# Patient Record
Sex: MALE | Race: BLACK OR AFRICAN AMERICAN | Employment: FULL TIME | URBAN - METROPOLITAN AREA
[De-identification: names, ages, dates, MRNs, and addresses within clinical notes are randomized per-mention and may not be internally consistent; named-entity substitution may affect disease eponyms.]

---

## 2018-01-16 ENCOUNTER — ED HISTORICAL/CONVERTED ENCOUNTER (OUTPATIENT)
Dept: OTHER | Age: 33
End: 2018-01-16

## 2018-04-18 ENCOUNTER — OP HISTORICAL/CONVERTED ENCOUNTER (OUTPATIENT)
Dept: OTHER | Age: 33
End: 2018-04-18

## 2018-05-03 ENCOUNTER — OP HISTORICAL/CONVERTED ENCOUNTER (OUTPATIENT)
Dept: OTHER | Age: 33
End: 2018-05-03

## 2018-06-01 ENCOUNTER — OP HISTORICAL/CONVERTED ENCOUNTER (OUTPATIENT)
Dept: OTHER | Age: 33
End: 2018-06-01

## 2019-08-03 ENCOUNTER — ED HISTORICAL/CONVERTED ENCOUNTER (OUTPATIENT)
Dept: OTHER | Age: 34
End: 2019-08-03

## 2020-04-01 ENCOUNTER — ED HISTORICAL/CONVERTED ENCOUNTER (OUTPATIENT)
Dept: OTHER | Age: 35
End: 2020-04-01

## 2020-09-08 ENCOUNTER — OP HISTORICAL/CONVERTED ENCOUNTER (OUTPATIENT)
Dept: OTHER | Age: 35
End: 2020-09-08

## 2020-09-28 ENCOUNTER — APPOINTMENT (OUTPATIENT)
Dept: PHYSICAL THERAPY | Age: 35
End: 2020-09-28

## 2020-10-02 ENCOUNTER — TRANSCRIBE ORDER (OUTPATIENT)
Dept: SCHEDULING | Age: 35
End: 2020-10-02

## 2020-10-02 ENCOUNTER — HOSPITAL ENCOUNTER (OUTPATIENT)
Dept: PHYSICAL THERAPY | Age: 35
Discharge: HOME | End: 2020-10-02
Payer: COMMERCIAL

## 2020-10-02 DIAGNOSIS — G89.29 CHRONIC BACK PAIN: Primary | ICD-10-CM

## 2020-10-02 DIAGNOSIS — M54.9 CHRONIC BACK PAIN: Primary | ICD-10-CM

## 2020-10-02 PROCEDURE — 97161 PT EVAL LOW COMPLEX 20 MIN: CPT
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
## 2020-10-02 NOTE — PROGRESS NOTES
[address] [address] [facility] [address] [facility], [address] [place] [address]  Ph: [phone]    Fax: [phone]    Plan of Care/Statement of Necessity for Physical Therapy Services  2-15    Patient name: Migel Champagne  : 1985  Provider#: [id number]  Referral source: Chani Galvez MD      Medical/Treatment Diagnosis: Low back pain [M54.5]     Prior Hospitalization: see medical history     Comorbidities: N/A  Prior Level of Function: Independent   Medications: Verified on Patient Summary List    Start of Care: 10/2/2020      Onset Date: 20       The Plan of Care and following information is based on the information from the initial evaluation. Assessment/ key information:   Patient referred to physical therapy with diagnosis of lower back pain. Patient presents with decreased LE strength, decreased balance, decreased hamstring  flexibility and limited back range of motion due to pain. Patient will benefit from skilled PT services to improve flexibility, ROM, strength and static/dynamic balance to improve gait impairments and overall mobility/ADLs. Patient plan of care will include patient education, HEP, there-ex, strengthening, flexibility, and balance/gait training. Problem List: pain affecting function, decrease ROM, decrease strength, impaired gait/ balance, decrease ADL/ functional abilitiies, decrease activity tolerance and decrease flexibility/ joint mobility   Treatment Plan may include any combination of the following: Therapeutic exercise, Therapeutic activities, Neuromuscular re-education, Gait/balance training, Manual therapy, Patient education and Functional mobility training  Patient / Family readiness to learn indicated by: asking questions, trying to perform skills and interest  Persons(s) to be included in education: patient (P)  Barriers to Learning/Limitations: None  Patient Goal (s):  To get ride of the pain.    Patient Self Reported Health Status: fair  Rehabilitation Potential: good    Short Term Goals: To be accomplished in 1-2  weeks: 1. Patient will be independent with HEP to assist with improving mobility and augment POC. Long Term Goals: To be accomplished in 3-6  weeks:  1. Patient will demonstrate increased HS flexibility 5-10 deg to reduce pain and gait dysfunction. 2. Patient will perform SLS on BLE >10sec arm cross to improve balance and stability   3. Patient will be able to band forward and  objects from the ground without difficulty and pain   4. Patient will tolerate sitting  without back pain to be able to perform his job duties. 5. Patient will be able to tolerate sleeping on his belly without pain to improve his quality of sleep  6. Patient will be able to walk without lateral sway and less pain    Frequency / Duration: Patient to be seen 2 times per week for 3-6  weeks. Patient/ Caregiver education and instruction: exercises    [x]  Plan of care has been reviewed with CLAUDIA Morales, PT 10/2/2020     ________________________________________________________________________    I certify that the above Therapy Services are being furnished while the patient is under my care. I agree with the treatment plan and certify that this therapy is necessary.     [phone] Signature:____________________  Date:____________Time: _________

## 2020-11-03 NOTE — ANCILLARY DISCHARGE INSTRUCTIONS
[address] [address] [facility] [address] [facility], [address] [place] [address]  Ph: [phone]  Fax: [phone]    Discharge Summary 2-15    Patient name: Reddy Khoury  : 1985  Provider#: [id number]  Referral source: Lea Beltrán MD      Medical/Treatment Diagnosis: Low back pain [M54.5]     Prior Hospitalization: see medical history     Comorbidities: See Plan of Care  Prior Level of Function: See Plan of Care  Medications: Verified on Patient Summary List  Start of Care: 10/2/2020  Onset Date:2020  Visits from Start of Care: 10/2/2020 Missed Visits:  Reporting Period :10/2/2020    Assessment/Summary of care:   Patient did not schedule appointments, therapist and office called a few times and left messages however patient did not call back. Edmond Ervinursula will be D/C from skilled PT services. GOALS:  Short Term Goals: To be accomplished in 1-2  weeks: 1. Patient will be independent with HEP to assist with improving mobility and augment POC.     Long Term Goals: To be accomplished in 3-6  weeks:  1. Patient will demonstrate increased HS flexibility 5-10 deg to reduce pain and gait dysfunction. 2. Patient will perform SLS on BLE >10sec arm cross to improve balance and stability   3. Patient will be able to band forward and  objects from the ground without difficulty and pain   4. Patient will tolerate sitting  without back pain to be able to perform his job duties. 5. Patient will be able to tolerate sleeping on his belly without pain to improve his quality of sleep  6.  Patient will be able to walk without lateral sway and less pain       RECOMMENDATIONS:  [x]Discontinue therapy: []Patient has reached or is progressing toward set goals     [x]Patient is non-compliant or has abdicated     []Due to lack of appreciable progress towards set goals     []Other  Maricel Morales, PT 11/3/2020

## 2021-03-30 ENCOUNTER — HOSPITAL ENCOUNTER (OUTPATIENT)
Dept: GENERAL RADIOLOGY | Age: 36
Discharge: HOME | End: 2021-03-30
Payer: MEDICAID

## 2021-03-30 ENCOUNTER — TRANSCRIBE ORDER (OUTPATIENT)
Dept: REGISTRATION | Age: 36
End: 2021-03-30

## 2021-03-30 DIAGNOSIS — M25.539 WRIST PAIN: ICD-10-CM

## 2021-03-30 DIAGNOSIS — M25.539 WRIST PAIN: Primary | ICD-10-CM

## 2021-03-30 PROCEDURE — 73110 X-RAY EXAM OF WRIST: CPT

## 2021-04-13 ENCOUNTER — TRANSCRIBE ORDER (OUTPATIENT)
Dept: SCHEDULING | Age: 36
End: 2021-04-13

## 2021-04-13 DIAGNOSIS — R60.9 EDEMA: Primary | ICD-10-CM

## 2021-08-11 ENCOUNTER — APPOINTMENT (OUTPATIENT)
Dept: PHYSICAL THERAPY | Age: 36
End: 2021-08-11
Payer: MEDICAID

## 2021-08-13 ENCOUNTER — HOSPITAL ENCOUNTER (OUTPATIENT)
Dept: PHYSICAL THERAPY | Age: 36
Discharge: HOME | End: 2021-08-13
Payer: MEDICAID

## 2021-08-13 PROCEDURE — 97167 OT EVAL HIGH COMPLEX 60 MIN: CPT

## 2021-08-13 NOTE — PROGRESS NOTES
[address] [address] [address] [facility], [address]  Ph: [phone]    Fax: [phone]    Initial Evaluation/Plan of Care/Statement of Necessity for Occupational Therapy Services     Patient name: Jose Villanueva   : 1985  [x]  Patient  Verified Provider#: [id number]    Start of Care: 21        Referral source: Mackenzie Hernandez MD Return visit to MD: 4-6 weeks       Medical/Treatment Diagnosis: Muscle weakness (generalized) [M62.81]  Carpal tunnel syndrome, unspecified upper limb [G56.00]    Payor: [address] / Plan: [facility] / Product Type: Managed Care Medicaid /       Prior Hospitalization: see medical history     Comorbidities: none known  Prior Level of Function: Mod I; painful to perform exercises, opening containers  Medications: Verified on Patient Summary List          Patient / Family readiness to learn indicated by: asking questions, trying to perform skills and interest    Persons(s) to be included in education: patient (P)    Barriers to Learning/Limitations: None    Patient Self Reported Health Status: good    Rehabilitation Potential: good    Previous Treatment/Compliance: no therapy; wore splint prior to surgery    PMHx/Surgical Hx: 21--right CTR    Work Hx: currently on leave of absence; return to work date: 21    Barriers to progress: none    Motivation: very good    Substance use: none reported    Cognition: A & O x 4     Onset Date: 2021      In time:114 p  Out time:204 Total Treatment Time (min): 50     Visit #: 1     SUBJECTIVE  Mechanism of Injury: Patient reports he first noticed symptoms in the right hand in February/march of this year. He reports he felt pain, noticed decreased range of motion when attempting  push ups, numbness, and  tingling. Patient seen by orthopedist sometime in March.  He was given a splint  To wear which patient states he wore on and off up until the surgery. Patient did not have any pre-surgery therapy. Patient states he was given the option for therapy or surgery; He chose surgery due to the pain he was having with using the right hand. Patient underwent right CTR on 6/1/21. Patient reports currently  wearing the splint occasionally at night. His goal is to be able to perform work duties without pain and difficulty as well as resume his exercise routine, especially performing push ups. PAIN:  Area of pain: right wrist to mid-palm   Pain Level (0-10 scale)  Worst: 10/10  Least: 3/10  Things that worsen pain: exercises (push ups); writing, typing; any activity engaged in for extended period of time   Things that ease pain: occasional use of OTC medication    Pain Level (0-10 scale) pre treatment: 5/10 Pain Level (0-10 scale) post treatment: 2/10    OBJECTIVE  Objective/Functional Measures:     ADL/IADLs:    Feeding: Mod I    LB  Dressing:  Tying shoes uncomfortable             Light Meal Prep:   Cutting food causes R hand pain  Driving:  Some pain reported if patient uses too much pressure to  the steering wheel  Handwriting: discomfort reported         Orthotics/Prosthetics: right wrist splint, used on occasion        Upper Extremity Assessment:    Hand Dominance: right    Opposition:    Right:  intact        Measurements: Taken with Chang Dynamometer, in lbs   Level 2 DATE  8/13 DATE DATE DATE DATE   Right 31       Left 80         Pinch Measurements: Taken with Pinch Gauge, in lbs    Date  8/13 Date Date   3 pt      Right 4     Left  8     Lateral      Right 12     Left 20     Tip      Right 3.5     Left 8.5       Fine Motor:     Nine-Hole Peg Test:  Right:  21  seconds        SENSATION:  R hand    Patient reports occasional  tingling in the fingertips  Reports numbness on dorsal surface of right hand, MP's to ~ PIP joint        Edema:    EDEMA Measurements:   In mm Right  8/13 Left  8/13 Right Left Right Left Right Left   THUMB           P1 68 65         DPC 21.1 cm 21.3cm         Wrist Crease 17.1 cm 16.8 cm         Figure 8          43.9 cm 42.5 cm             ROM:     Active Active   DATE:  8/13 8/13       Right Left Right Right   Forearm Supination WFL WFL      Pronation [facility] WFL     Wrist Flex 66 78      Ext 60 63      Ulnar Dev 34 40      Radial Dev 23 22     Finger ABD/ADD WFL       MP ext [facility]       Flex/ext [facility]        Hand Appearance/Additional comments:  Right volar incision: well approximated  Tenderness reported with pressure to/around incision  Mild hypersensitivity reported by patient with pressure to /around incision        ASSESSMENT/Changes in Function:   Patient is a 44 y/o right  Hand dominant male referred to Occupational therapy services following  carpal tunnel release surgery on 6/1/21. Patient reports limitations in ADLs (tying shoes), IADLs (food prep, handwriting), work (typing), and leisure tasks(justin) due to pain, increased sensitivity, edema, decreased strength in the right hand. Right wrist ,forearm, and finger  ROM are WFLs. Feel patient will benefit from skilled occupational therapy services to promote return to PLOF, improve QoL. Am-Pac:51.04 %;   BCTQ severity scale: 3.18. BCTQ (Lloyd Carpal Tunnel Questionnaire) functional scale: 2.62    Problem List/Impairments: Pain effecting function, Decreased strength, Edema effecting function, Decreased coordination/prehension, Decreased ADL/functional abilities , Decreased activity tolerance and Sensability   Frequency / Duration: Patient to be seen 1-2 times per week for 8 treatments.   Certification Period: 8/13/21 - 9/25/21    Treatment Plan may include any combination of the following: Therapeutic exercise, Therapeutic activities, Neuromuscular re-education, Physical agent/modality, Scar management, Manual therapy, Splinting/orthoses, Patient education and ADLs/IADLs    Patient/ Caregiver education and instruction: exercises and other OT POC, scar and edema management     Patient Goal (s): to type, write without pain as well as do push ups    Short Term Goals: To be accomplished in 4 treatments:   1. Patient will be independent with home exercise program to promote gains between sessions    [] Met [] Not met [] Partially met    2. Patient will be independent with scar and edema management techniques as noted by a reduction of 1 + mm in all measurements of the hand    [] Met [] Not met [] Partially met     Long Term Goals: To be accomplished in 8 treatments:   1. Patient will be able to engage in handwriting activities with little to no pain    [] Met [] Not met [] Partially met    2. Patient will be able to engage in typing activities with little to no pain for  30 + minutes taking rest breaks as needed    [] Met [] Not met [] Partially met    3. Patient will be able to perform push ups with little to no difficulty/pain    [] Met [] Not met [] Partially met    4. Patient will be independent via verbalization/demonstration of 2+ ergonomic strategies/tasks modifications to allow for  engagement in daily activities with little to no pain   [] Met [] Not met [] Partially met     [x]  Plan of care will be reviewed with LAZ. The Plan of Care is based on information from the initial evaluation. [facility], OTR/L 8/13/2021   ________________________________________________________________________    I certify that the above Therapy Services are being furnished while the patient is under my care. I agree with the treatment plan and certify that this therapy is necessary.     [de-identified] Signature:____________________  Date:____________Time: _________

## 2021-09-22 ENCOUNTER — HOSPITAL ENCOUNTER (OUTPATIENT)
Dept: PHYSICAL THERAPY | Age: 36
Discharge: HOME | End: 2021-09-22
Payer: MEDICAID

## 2021-09-22 PROCEDURE — 97110 THERAPEUTIC EXERCISES: CPT

## 2021-09-22 NOTE — PROGRESS NOTES
OT DAILY TREATMENT NOTE  3-16    Patient Name: Galdino Yip  Date:2021  : 1985  [x]  Patient  Verified  Payor: [address] / Plan: [address] [facility] / Product Type: Managed Care Medicaid /    In time:1110  Out time:1144  Total Treatment Time (min): 34  Visit #: 2 of 8    Treatment Area: Muscle weakness (generalized) [M62.81]  Carpal tunnel syndrome, unspecified upper limb [G56.00]    SUBJECTIVE  Pain Level (0-10 scale): 7 - 8/10, R wrist, dorsal surface  Any medication changes, allergies to medications, adverse drug reactions, diagnosis change, or new procedure performed?: [x] No    [] Yes (see summary sheet for update)  Subjective functional status/changes:   [x] No changes reported  \"It really hurts <R wrist>, especially when I bend it\" states patient. \"See that bump? Is it a ganglion cyst?\" asks patient . Deferred diagnosis of \"bump\" to MD (next visit)    OBJECTIVE    34 min Therapeutic Exercise:  [x] See flow sheet :   Rationale: increase ROM and increase strength to improve the patients ability to use the R hand with less pain/difficulty when engaging in daily activities. --re-assessment completed for upcoming MD visit   --noted/palpated \"bump\" on dorsal wrist when wrist is in full flexion.   Bump hard with pain/discomfort reported when pressure applied  --no restriction noted along volar scar    With   [x] TE   [] TA   [] neuro   [] other: Patient Education: [x] Review HEP    [] Progressed/Changed HEP based on:   [x] positioning (at computer/job duties)    [] body mechanics   [] transfers   [] heat/ice application   [x] Splint wear--wear splint at night until seen by MD  [] Sensory re-education   [] scar management      [] other:             Other Objective/Functional Measures:  Measurements: Taken with Chang Dynamometer, in lbs   Level 2 DATE   DATE   DATE DATE DATE   Right 31 37         Left 80 98             Pinch Measurements: Taken with Pinch Gauge, in lbs   Date  8/13 Date  9/22 Date   3 pt         Right 4 5.5      Left  8  n/t     Lateral         Right 12  11     Left 20  n/t     Tip         Right 3.5  5.5     Left 8.5  n/t        Edema:     EDEMA Measurements: In mm Right  8/13 Left  8/13 Right  9/22 Left Right Left Right Left   THUMB           P1 68 65  68             DPC 21.1 cm 21.3cm  21.8             Wrist Crease 17.1 cm 16.8 cm  17.0             Figure 8          43.9 cm 42.5 cm  43.5 cm                  ROM:               Active Active   DATE:   8/13 8/13 9/22         Right Left Right Right   Forearm Supination [facility] WFL         Pronation [facility]       Wrist Flex 66 78  63       Ext 60 63  52       Ulnar Dev 34 40  30       Radial Dev 23 22  18     Finger ABD/ADD WFL           MP ext WFL           Flex/ext WFL                Pain Level (0-10 scale) post treatment: 7-8/10, right wrist    ASSESSMENT/Changes in Function:   Patient tolerated treatment session activities without difficulty. Moderate + pain reported in R wrist , mainly with full flexion. Pea-sized \"bump\" noted on dorsal R wrist with full flexion--discomfort reported with touch/pressure. Deferred \"diagnosis\" of bump to MD (upcoming visit next week). Patient is independent with HEP. No restrictions palpated along volar scar at base of R hand/wrist. Min changes in R hand edema:  See above for details. Feel patient may continue to benefit from skilled occupational therapy services due to limitations in hand strength and wrist RoM  (decreased since evaluation) pending outcome of upcoming MD follow up visit. Patient will continue to benefit from skilled OT services to modify and progress therapeutic interventions, address ROM deficits, address strength deficits, analyze and address soft tissue restrictions, analyze and modify body mechanics/ergonomics and address pain and edema  to attain remaining goals.      []  See Plan of Care  [x]  See progress note/recertification  []  See Discharge Summary         Progress towards goals / Updated goals:  Short Term Goals: To be accomplished in 4 treatments:              1.  Patient will be independent with home exercise program to promote gains between sessions                          [x]? Met []? Not met []? Partially met               2.  Patient will be independent with scar and edema management techniques as noted by a reduction of 1 + mm in all measurements of the hand                          []? Met []? Not met [x]? Partially met      Long Term Goals: To be accomplished in 8 treatments:              1.  Patient will be able to engage in handwriting activities with little to no pain                          []? Met []? Not met []? Partially met               2.  Patient will be able to engage in typing activities with little to no pain for  30 + minutes taking rest breaks as needed                          []? Met []? Not met [x]? Partially met               3. Patient will be able to perform push ups with little to no difficulty/pain                          []? Met [x]? Not met []? Partially met               4.  Patient will be independent via verbalization/demonstration of 2+ ergonomic strategies/tasks modifications to allow for  engagement in daily activities with little to no pain              []? Met []? Not met []?  Partially met     PLAN  [x]  Upgrade activities as tolerated     [x]  Continue plan of care  []  Update interventions per flow sheet       []  Discharge due to:_  [x]  Other: follow up on MD visit    [facility], TORI/L 9/22/2021

## 2021-12-02 ENCOUNTER — APPOINTMENT (OUTPATIENT)
Dept: PHYSICAL THERAPY | Age: 36
End: 2021-12-02

## 2022-02-22 NOTE — PROGRESS NOTES
[address]  Ph: [phone]  Fax: [phone]    Medicaid Discharge Summary 2-15    Patient name: Юлия Prieto  : 1985  Provider#: [id number]  Referral source: Nathanael Mckenzie MD      Medical/Treatment Diagnosis: Muscle weakness (generalized) [M62.81]  Carpal tunnel syndrome, unspecified upper limb [G56.00]     Prior Hospitalization: see medical history     Comorbidities: See Plan of Care  Prior Level of Function: See Plan of Care  Medications: Verified on Patient Summary List    Start of Care: 21  Onset Date: (see initial plan of care)  Visits from Start of Care: 2  Missed Visits: 2    Certification Period : 21 to 21    Assessment/Summary of care/GOALS:   Patient referred to Occupational Therapy services following right  CTR surgery on 21. Patient seen for evaluation on 21; returned 1 month later for second appointment on 21. Patient called several in 2021 to make additional appointments; however, his current authorization for visits had . New authorization obtained. Visit scheduled for 21; however, this visit was cancelled. Patient has not called to schedule additional visits since this time. Current status; Unknown. Goals not re-assessed. Patient will be discharged from Occupational Therapy services at this time. Thank you for the referral.       Short Term Goals: To be accomplished in 4 treatments:              1.  Patient will be independent with home exercise program to promote gains between sessions                          [x]? ? Met []? ? Not met []? ? Partially met               2. Lisa Sheppard will be independent with scar and edema management techniques as noted by a reduction of 1 + mm in all measurements of the hand                          []? ? Met []? ? Not met [x]? ? Partially met      Long Term Goals: To be accomplished in 8 treatments:              9. Nellie Landau will be able to engage in handwriting activities with little to no pain                          []? ? Met []? ? Not met []? ? Partially met               2. Nellie Landau will be able to engage in typing activities with little to no pain for  30 + minutes taking rest breaks as needed                          []? ? Met []? ? Not met [x]? ? Partially met               3. Patient will be able to perform push ups with little to no difficulty/pain                          []? ? Met [x]? ? Not met []? ? Partially met               4. Nellie Landau will be independent via verbalization/demonstration of 2+ ergonomic strategies/tasks modifications to allow for  engagement in daily activities with little to no pain              []? ? Met []? ? Not met []? ? Partially met     [facility] Score:  n/a    RECOMMENDATIONS:  [x]Discontinue therapy:      [x]Patient is non-compliant or has abdicated     Loma Lennox, OTR/L 2/22/2022   Retain this original for your records. If you are unable to process this request in 24 hours, please contact our office.   ________________________________________________________________________  NOTE TO PHYSICIAN:  Please complete the following and FAX to: Alcides GOODEN [facility]:  Fax: [phone]   ____ I have read the above report and request that my patient be discharged from therapy.     Physician's Signature:_____________________________________________________ Date:_____________Time:_____________     Jude Fagan MD

## 2022-12-18 ENCOUNTER — HOSPITAL ENCOUNTER (EMERGENCY)
Age: 37
Discharge: HOME | End: 2022-12-19
Attending: EMERGENCY MEDICINE
Payer: MEDICAID

## 2022-12-18 VITALS
DIASTOLIC BLOOD PRESSURE: 70 MMHG | TEMPERATURE: 97.7 F | RESPIRATION RATE: 18 BRPM | HEART RATE: 71 BPM | SYSTOLIC BLOOD PRESSURE: 118 MMHG | HEIGHT: 65 IN | OXYGEN SATURATION: 99 % | WEIGHT: 180 LBS | BODY MASS INDEX: 29.99 KG/M2

## 2022-12-18 DIAGNOSIS — K08.89 PAIN, DENTAL: Primary | ICD-10-CM

## 2022-12-18 PROCEDURE — 99282 EMERGENCY DEPT VISIT SF MDM: CPT

## 2022-12-19 NOTE — ED TRIAGE NOTES
Patient presents with left lower dental pain. States had 2 wisdom teeth pulled and a deep cleaning on Thursday 12/15/2022.

## 2022-12-19 NOTE — ED PROVIDER NOTES
EMERGENCY DEPARTMENT HISTORY AND PHYSICAL EXAM      Date: 12/18/2022  Patient Name: Holly Ramirez    History of Presenting Illness     Chief Complaint   Patient presents with    Dental Pain       History Provided By: Patient    HPI: Holly Ramirez, 42 y.o. male presents to the ED with CC of of a possible bone spur on his left lower jaw. He says he is just had dental surgery and says that he can feel it with his tongue would like us to pull it off. I explained that I am not a dentist and unable to do this. Denies any other signs or symptoms at this point time he says just irritating. Patient denies SOB, chest pain, or any neurological symptoms. There are no other complaints, changes, or physical findings at this time. Past History     Past Medical History:  History reviewed. No pertinent past medical history. Allergies:  No Known Allergies    Review of Systems   Vital signs and nursing notes reviewed  Review of Systems   Constitutional:  Negative for chills and fever. HENT:  Positive for dental problem. Neurological:  Negative for weakness and headaches. Physical Exam   Visit Vitals  /70   Pulse 71   Temp 97.7 °F (36.5 °C)   Resp 18   Ht 5' 5\" (1.651 m)   Wt 81.6 kg (180 lb)   SpO2 99%   BMI 29.95 kg/m²     CONSTITUTIONAL: Alert, in no distress. Appears stated age. HEAD:  Normocephalic, atraumatic  EYES: EOM intact. No conjunctival injection or scleral icterus  Neck:  Supple. No meningismus  RESP: Normal with no work of breathing, speaking in full sentences. CV: Well perfused. NEURO: Alert with normal mentation, moving extremities spontaneously  MSK: FROM of all extremities without neuro, motor, vascular or sensory embarassment  ENT: clear without erythema, exudate or edema    Medical Decision Making     ED Course:   Initial assessment performed. The patients presenting problems have been discussed, and they are in agreement with the care plan formulated and outlined with them. I have encouraged them to ask questions as they arise throughout their visit. Critical Care Time: None    Disposition:  DISCHARGE NOTE:  The pt is ready for discharge. The pt's signs, symptoms, diagnosis, and discharge instructions have been discussed and pt has conveyed their understanding. The pt is to follow up as recommended or return to ER should their symptoms worsen. Plan has been discussed and pt is in agreement. PLAN:  1. There are no discharge medications for this patient. 2.   Follow-up Information       Follow up With Specialties Details Why [address] [facility] Call in 1 day  [address] [address]  [phone]          3. Take Tylenol or Ibuprofen as needed  4. Drink plenty of fluids  5. Return to ED if worse especially if any shortness of breath, chest pain or altered mentation. Diagnosis     Clinical Impression:   1. Pain, dental            Please note that this dictation was completed with [email or web address], the computer voice recognition software. Quite often unanticipated grammatical, syntax, homophones, and other interpretive errors are inadvertently transcribed by the computer software. Please   disregards these errors. Please excuse any errors that have escaped final proofreading.

## 2023-08-18 ENCOUNTER — HOSPITAL ENCOUNTER (EMERGENCY)
Facility: HOSPITAL | Age: 38
Discharge: HOME | End: 2023-08-18
Attending: STUDENT IN AN ORGANIZED HEALTH CARE EDUCATION/TRAINING PROGRAM
Payer: COMMERCIAL

## 2023-08-18 VITALS
BODY MASS INDEX: 29.99 KG/M2 | HEART RATE: 72 BPM | SYSTOLIC BLOOD PRESSURE: 133 MMHG | OXYGEN SATURATION: 98 % | RESPIRATION RATE: 15 BRPM | WEIGHT: 180 LBS | DIASTOLIC BLOOD PRESSURE: 93 MMHG | HEIGHT: 65 IN | TEMPERATURE: 98.4 F

## 2023-08-18 DIAGNOSIS — Z11.3 SCREENING EXAMINATION FOR STD (SEXUALLY TRANSMITTED DISEASE): ICD-10-CM

## 2023-08-18 DIAGNOSIS — S39.012A STRAIN OF LUMBAR REGION, INITIAL ENCOUNTER: Primary | ICD-10-CM
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
## 2023-08-18 LAB
C TRACH DNA SPEC QL NAA+PROBE: NEGATIVE
N GONORRHOEA DNA SPEC QL NAA+PROBE: NEGATIVE
SAMPLE TYPE: NORMAL
SERVICE CMNT-IMP: NORMAL
SPECIMEN SOURCE: NORMAL

## 2023-08-18 PROCEDURE — 87491 CHLMYD TRACH DNA AMP PROBE: CPT

## 2023-08-18 PROCEDURE — 2500000003 HC RX 250 WO HCPCS: Performed by: STUDENT IN AN ORGANIZED HEALTH CARE EDUCATION/TRAINING PROGRAM

## 2023-08-18 PROCEDURE — 96372 THER/PROPH/DIAG INJ SC/IM: CPT

## 2023-08-18 PROCEDURE — 87591 N.GONORRHOEAE DNA AMP PROB: CPT

## 2023-08-18 PROCEDURE — 99284 EMERGENCY DEPT VISIT MOD MDM: CPT

## 2023-08-18 PROCEDURE — 6360000002 HC RX W HCPCS: Performed by: STUDENT IN AN ORGANIZED HEALTH CARE EDUCATION/TRAINING PROGRAM

## 2023-08-18 RX ORDER — NAPROXEN 500 MG/1
500 TABLET ORAL 2 TIMES DAILY
Qty: 14 TABLET | Refills: 0 | Status: SHIPPED | OUTPATIENT
Start: 2023-08-18 | End: 2023-08-21 | Stop reason: ALTCHOICE

## 2023-08-18 RX ORDER — DOXYCYCLINE HYCLATE 100 MG
100 TABLET ORAL 2 TIMES DAILY
Qty: 20 TABLET | Refills: 0 | Status: SHIPPED | OUTPATIENT
Start: 2023-08-18 | End: 2023-08-28

## 2023-08-18 RX ORDER — LIDOCAINE 50 MG/G
1 PATCH TOPICAL DAILY
Qty: 10 PATCH | Refills: 0 | Status: SHIPPED | OUTPATIENT
Start: 2023-08-18

## 2023-08-18 RX ORDER — KETOROLAC TROMETHAMINE 30 MG/ML
30 INJECTION, SOLUTION INTRAMUSCULAR; INTRAVENOUS
Status: COMPLETED | OUTPATIENT
Start: 2023-08-18 | End: 2023-08-18

## 2023-08-18 RX ADMIN — LIDOCAINE HYDROCHLORIDE 500 MG: 10 INJECTION, SOLUTION EPIDURAL; INFILTRATION; INTRACAUDAL; PERINEURAL at 11:44

## 2023-08-18 RX ADMIN — KETOROLAC TROMETHAMINE 30 MG: 30 INJECTION, SOLUTION INTRAMUSCULAR; INTRAVENOUS at 11:43

## 2023-08-18 ASSESSMENT — LIFESTYLE VARIABLES
HOW MANY STANDARD DRINKS CONTAINING ALCOHOL DO YOU HAVE ON A TYPICAL DAY: PATIENT DOES NOT DRINK
HOW OFTEN DO YOU HAVE A DRINK CONTAINING ALCOHOL: NEVER

## 2023-08-18 ASSESSMENT — PAIN - FUNCTIONAL ASSESSMENT: PAIN_FUNCTIONAL_ASSESSMENT: 0-10

## 2023-08-18 ASSESSMENT — PAIN DESCRIPTION - LOCATION: LOCATION: LEG;BACK

## 2023-08-18 ASSESSMENT — PAIN SCALES - GENERAL: PAINLEVEL_OUTOF10: 7
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
## 2023-08-21 ENCOUNTER — APPOINTMENT (OUTPATIENT)
Facility: HOSPITAL | Age: 38
End: 2023-08-21
Payer: COMMERCIAL

## 2023-08-21 ENCOUNTER — HOSPITAL ENCOUNTER (EMERGENCY)
Facility: HOSPITAL | Age: 38
Discharge: HOME | End: 2023-08-21
Attending: STUDENT IN AN ORGANIZED HEALTH CARE EDUCATION/TRAINING PROGRAM
Payer: COMMERCIAL

## 2023-08-21 VITALS
HEIGHT: 65 IN | OXYGEN SATURATION: 99 % | WEIGHT: 180 LBS | BODY MASS INDEX: 29.99 KG/M2 | HEART RATE: 77 BPM | TEMPERATURE: 98.5 F | RESPIRATION RATE: 18 BRPM | DIASTOLIC BLOOD PRESSURE: 81 MMHG | SYSTOLIC BLOOD PRESSURE: 129 MMHG

## 2023-08-21 DIAGNOSIS — S39.012A STRAIN OF LUMBAR PARASPINOUS MUSCLE, INITIAL ENCOUNTER: Primary | ICD-10-CM

## 2023-08-21 DIAGNOSIS — S39.012A STRAIN OF LUMBAR REGION, INITIAL ENCOUNTER: ICD-10-CM

## 2023-08-21 PROCEDURE — 72100 X-RAY EXAM L-S SPINE 2/3 VWS: CPT

## 2023-08-21 PROCEDURE — 99284 EMERGENCY DEPT VISIT MOD MDM: CPT

## 2023-08-21 PROCEDURE — 96372 THER/PROPH/DIAG INJ SC/IM: CPT

## 2023-08-21 PROCEDURE — 6360000002 HC RX W HCPCS

## 2023-08-21 RX ORDER — KETOROLAC TROMETHAMINE 30 MG/ML
30 INJECTION, SOLUTION INTRAMUSCULAR; INTRAVENOUS
Status: COMPLETED | OUTPATIENT
Start: 2023-08-21 | End: 2023-08-21

## 2023-08-21 RX ORDER — METHOCARBAMOL 750 MG/1
750 TABLET, FILM COATED ORAL 4 TIMES DAILY
Qty: 40 TABLET | Refills: 0 | Status: SHIPPED | OUTPATIENT
Start: 2023-08-21 | End: 2023-08-31

## 2023-08-21 RX ORDER — ACETAMINOPHEN 500 MG
1000 TABLET ORAL 3 TIMES DAILY PRN
Qty: 30 TABLET | Refills: 0 | Status: SHIPPED | OUTPATIENT
Start: 2023-08-21 | End: 2023-08-31

## 2023-08-21 RX ORDER — KETOROLAC TROMETHAMINE 10 MG/1
10 TABLET, FILM COATED ORAL EVERY 6 HOURS PRN
Qty: 20 TABLET | Refills: 0 | Status: SHIPPED | OUTPATIENT
Start: 2023-08-21

## 2023-08-21 RX ORDER — BUTALBITAL, ACETAMINOPHEN AND CAFFEINE 50; 325; 40 MG/1; MG/1; MG/1
1 TABLET ORAL
Status: CANCELLED | OUTPATIENT
Start: 2023-08-21 | End: 2023-08-21

## 2023-08-21 RX ADMIN — KETOROLAC TROMETHAMINE 30 MG: 30 INJECTION, SOLUTION INTRAMUSCULAR; INTRAVENOUS at 15:02

## 2023-08-21 ASSESSMENT — LIFESTYLE VARIABLES
HOW MANY STANDARD DRINKS CONTAINING ALCOHOL DO YOU HAVE ON A TYPICAL DAY: 1 OR 2
HOW OFTEN DO YOU HAVE A DRINK CONTAINING ALCOHOL: MONTHLY OR LESS

## 2023-08-21 ASSESSMENT — PAIN - FUNCTIONAL ASSESSMENT
PAIN_FUNCTIONAL_ASSESSMENT: 0-10
PAIN_FUNCTIONAL_ASSESSMENT: 0-10

## 2023-08-21 ASSESSMENT — PAIN DESCRIPTION - ORIENTATION: ORIENTATION: LOWER

## 2023-08-21 ASSESSMENT — PAIN SCALES - GENERAL
PAINLEVEL_OUTOF10: 0
PAINLEVEL_OUTOF10: 9

## 2023-08-21 ASSESSMENT — PAIN DESCRIPTION - LOCATION: LOCATION: BACK
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
## 2023-09-22 ENCOUNTER — HOSPITAL ENCOUNTER (OUTPATIENT)
Facility: HOSPITAL | Age: 38
Discharge: HOME | End: 2023-09-22
Payer: COMMERCIAL

## 2023-09-22 DIAGNOSIS — M54.50 LOW BACK PAIN, UNSPECIFIED BACK PAIN LATERALITY, UNSPECIFIED CHRONICITY, UNSPECIFIED WHETHER SCIATICA PRESENT: ICD-10-CM

## 2023-09-22 PROCEDURE — 72131 CT LUMBAR SPINE W/O DYE: CPT

## 2023-10-04 ENCOUNTER — HOSPITAL ENCOUNTER (OUTPATIENT)
Facility: HOSPITAL | Age: 38
Setting detail: RECURRING SERIES
Discharge: HOME | End: 2023-10-07
Payer: COMMERCIAL

## 2023-10-04 PROCEDURE — 97110 THERAPEUTIC EXERCISES: CPT

## 2023-10-04 PROCEDURE — G0283 ELEC STIM OTHER THAN WOUND: HCPCS

## 2023-10-04 PROCEDURE — 97161 PT EVAL LOW COMPLEX 20 MIN: CPT
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
## 2023-10-16 ENCOUNTER — HOSPITAL ENCOUNTER (OUTPATIENT)
Facility: HOSPITAL | Age: 38
Setting detail: RECURRING SERIES
Discharge: HOME | End: 2023-10-19
Payer: COMMERCIAL

## 2023-10-16 PROCEDURE — 97110 THERAPEUTIC EXERCISES: CPT

## 2023-10-16 PROCEDURE — G0283 ELEC STIM OTHER THAN WOUND: HCPCS

## 2023-10-16 NOTE — PROGRESS NOTES
PHYSICAL THERAPY - MEDICARE DAILY TREATMENT NOTE (updated 3/23)      Date: 10/16/2023          Patient Name:  Jonel Fernando :  1985   Medical   Diagnosis:  Other low back pain [M54.59] Treatment Diagnosis:  M54.59  OTHER LOWER BACK PAIN    Referral Source:  Daniela Houstonnethy* Insurance:   Payor: Torres Castillo / Plan: Mitch White / Product Type: *No Product type* /                     Patient  verified yes     Visit #   Current  / Total 2 16   Time   In / Out 9:32am 10:14am   Total Treatment Time 42   Total Timed Codes 27   1:1 Treatment Time 27      [facility] Totals Reminder:  bill using total billable   min of TIMED therapeutic procedures and modalities. 8-22 min = 1 unit; 23-37 min = 2 units; 38-52 min = 3 units; 53-67 min = 4 units; 68-82 min = 5 units            SUBJECTIVE    Pain Level (0-10 scale): 8/10    Any medication changes, allergies to medications, adverse drug reactions, diagnosis change, or new procedure performed?: [x] No    [] Yes (see summary sheet for update)  Medications: Verified on Patient Summary List    Subjective functional status/changes:     Pt reports continued significant pain. Has been trying to do HEP. Reports MD has kept him off work until 12/10 (will follow up with MD prior to returning). Pt also reporting R wrist pain- has had carpal tunnel sx   *Pt arrived 17 minutes late    OBJECTIVE      Therapeutic Procedures: Tx Min Billable or 1:1 Min (if diff from Tx Min) Procedure, Rationale, Specifics   27  90075 Therapeutic Exercise (timed):  increase ROM, strength, coordination, balance, and proprioception to improve patient's ability to progress to PLOF and address remaining functional goals.  (see flow sheet as applicable)     Details if applicable:            Details if applicable:           Details if applicable:           Details if applicable:            Details if applicable:     27     Total Total         Modalities Rationale:     decrease inflammation and

## 2023-10-18 ENCOUNTER — HOSPITAL ENCOUNTER (OUTPATIENT)
Facility: HOSPITAL | Age: 38
Setting detail: RECURRING SERIES
Discharge: HOME | End: 2023-10-21
Payer: COMMERCIAL

## 2023-10-18 PROCEDURE — 97110 THERAPEUTIC EXERCISES: CPT

## 2023-10-18 PROCEDURE — G0283 ELEC STIM OTHER THAN WOUND: HCPCS

## 2023-10-18 NOTE — PROGRESS NOTES
PHYSICAL THERAPY - MEDICARE DAILY TREATMENT NOTE (updated 3/23)      Date: 10/18/2023          Patient Name:  Adela Boudreaux :  1985   Medical   Diagnosis:  Other low back pain [M54.59] Treatment Diagnosis:  M54.59  OTHER LOWER BACK PAIN    Referral Source:  Rachell Certain* Insurance:   Payor: Imelda Course / Plan: Ayo Fitch / Product Type: *No Product type* /                     Patient  verified yes     Visit #   Current  / Total 2 16   Time   In / Out 0933 1017   Total Treatment Time 44   Total Timed Codes 29   1:1 Treatment Time 29      [facility] Totals Reminder:  bill using total billable   min of TIMED therapeutic procedures and modalities. 8-22 min = 1 unit; 23-37 min = 2 units; 38-52 min = 3 units; 53-67 min = 4 units; 68-82 min = 5 units            SUBJECTIVE    Pain Level (0-10 scale): 8/10    Any medication changes, allergies to medications, adverse drug reactions, diagnosis change, or new procedure performed?: [x] No    [] Yes (see summary sheet for update)  Medications: Verified on Patient Summary List    Subjective functional status/changes:     Pt was 30 minutes late for appointment, thought it was a different time. Pt c/o radicular pain in RLE and LBP . Has been doing some stretching at home. OBJECTIVE      Therapeutic Procedures: Tx Min Billable or 1:1 Min (if diff from Tx Min) Procedure, Rationale, Specifics   29  39067 Therapeutic Exercise (timed):  increase ROM, strength, coordination, balance, and proprioception to improve patient's ability to progress to PLOF and address remaining functional goals.  (see flow sheet as applicable)     Details if applicable:            Details if applicable:           Details if applicable:           Details if applicable:            Details if applicable:     29     Total Total         Modalities Rationale:     decrease inflammation and decrease pain to improve patient's ability to progress to PLOF and address remaining functional

## 2023-10-25 ENCOUNTER — HOSPITAL ENCOUNTER (OUTPATIENT)
Facility: HOSPITAL | Age: 38
Setting detail: RECURRING SERIES
Discharge: HOME | End: 2023-10-28
Payer: COMMERCIAL

## 2023-10-25 PROCEDURE — 97110 THERAPEUTIC EXERCISES: CPT

## 2023-10-25 PROCEDURE — G0283 ELEC STIM OTHER THAN WOUND: HCPCS

## 2023-10-25 NOTE — PROGRESS NOTES
rubs/massage as instructed to decrease pain and inflammation. No pain > 5/10 on VAS. [] Met [] Not met [] Partially met  Date:     Pt will demonstrate improved postural awareness for sitting and standing and can verbalize reasoning and ergonomic factors related to this to decrease pain. [] Met [] Not met [] Partially met  Date:     Pt will use proper sleeping techniques for pain prevention and ideal posture to prevent muscle shortening contributing to pain issues. [] Met [] Not met [] Partially met  Date:      Long Term Goals: To be accomplished in 16 treatments. Pt will have improved AMPAC score by 5%. [] Met [] Not met [] Partially met Date:      Pt will be able to sit greater than 20-30 minutes without pain for greater ease at work. [] Met [] Not met [] Partially met Date:      Pt will be able to stand greater than 20-30 minutes without pain > 3/10 so they can do ADL's like wash dishes. [] Met [] Not met [] Partially met Date:      Pt will be able to ambulate community distances without increase of pain or difficulty to get groceries, medicine, etc.      [] Met [] Not met [] Partially met Date:      Pt will be able to go up and down steps without difficulty or pain > 3/10 for greater ease getting in/out of home. [] Met [] Not met [] Partially met Date:      Pt will be able to retrieve items from the ground without pain, for safe return to work. [] Met [] Not met [] Partially met Date:      Pt will be able to lift/carry >/= 10-20 lbs without pain, for safe return to work. [] Met [] Not met [] Partially met Date:      Pt will report centralization of symptoms/less radicular pain by 50%. [] Met [] Not met [] Partially met Date:                     PLAN  Frequency / Duration: Patient to be seen 2 times per week for 16 treatments.     Yes  Continue plan of care  Re-Cert Due: after 16 visits  [x]  Upgrade activities as tolerated  []  Discharge due to

## 2023-10-27 ENCOUNTER — HOSPITAL ENCOUNTER (OUTPATIENT)
Facility: HOSPITAL | Age: 38
Setting detail: RECURRING SERIES
Discharge: HOME | End: 2023-10-30
Payer: COMMERCIAL

## 2023-10-27 PROCEDURE — G0283 ELEC STIM OTHER THAN WOUND: HCPCS

## 2023-10-27 PROCEDURE — 97110 THERAPEUTIC EXERCISES: CPT

## 2023-10-27 NOTE — PROGRESS NOTES
[] Partially met  Date:     Pt will use proper sleeping techniques for pain prevention and ideal posture to prevent muscle shortening contributing to pain issues. [] Met [] Not met [] Partially met  Date:      Long Term Goals: To be accomplished in 16 treatments. Pt will have improved AMPAC score by 5%. [] Met [] Not met [] Partially met Date:      Pt will be able to sit greater than 20-30 minutes without pain for greater ease at work. [] Met [] Not met [] Partially met Date:      Pt will be able to stand greater than 20-30 minutes without pain > 3/10 so they can do ADL's like wash dishes. [] Met [] Not met [] Partially met Date:      Pt will be able to ambulate community distances without increase of pain or difficulty to get groceries, medicine, etc.      [] Met [] Not met [] Partially met Date:      Pt will be able to go up and down steps without difficulty or pain > 3/10 for greater ease getting in/out of home. [] Met [] Not met [] Partially met Date:      Pt will be able to retrieve items from the ground without pain, for safe return to work. [] Met [] Not met [] Partially met Date:      Pt will be able to lift/carry >/= 10-20 lbs without pain, for safe return to work. [] Met [] Not met [] Partially met Date:      Pt will report centralization of symptoms/less radicular pain by 50%. [] Met [] Not met [] Partially met Date:                     PLAN  Frequency / Duration: Patient to be seen 2 times per week for 16 treatments.     Yes  Continue plan of care  Re-Cert Due: after 16 visits  [x]  Upgrade activities as tolerated  []  Discharge due to :  []  Other:      Carlos Sargent PTA       10/27/2023       10:42 AM

## 2023-11-01 ENCOUNTER — HOSPITAL ENCOUNTER (OUTPATIENT)
Facility: HOSPITAL | Age: 38
Setting detail: RECURRING SERIES
Discharge: HOME | End: 2023-11-04
Payer: COMMERCIAL

## 2023-11-01 PROCEDURE — 97140 MANUAL THERAPY 1/> REGIONS: CPT

## 2023-11-01 PROCEDURE — G0283 ELEC STIM OTHER THAN WOUND: HCPCS

## 2023-11-01 PROCEDURE — 97110 THERAPEUTIC EXERCISES: CPT

## 2023-11-01 NOTE — PROGRESS NOTES
PHYSICAL THERAPY - MEDICARE DAILY TREATMENT NOTE (updated 3/23)      Date: 2023          Patient Name:  Jarrett Gutpa :  1985   Medical   Diagnosis:  Other low back pain [M54.59] Treatment Diagnosis:  M54.59  OTHER LOWER BACK PAIN    Referral Source:  Bill Jules* Insurance:   Payor: Makenna Wallace / Plan: Memo Meza / Product Type: *No Product type* /                     Patient  verified yes     Visit #   Current  / Total 6 16   Time   In / Out 9:55am 11:50am   Total Treatment Time 55   Total Timed Codes 40   1:1 Treatment Time 40      [facility] Totals Reminder:  bill using total billable   min of TIMED therapeutic procedures and modalities. 8-22 min = 1 unit; 23-37 min = 2 units; 38-52 min = 3 units; 53-67 min = 4 units; 68-82 min = 5 units            SUBJECTIVE    Pain Level (0-10 scale): 7-8/10    Any medication changes, allergies to medications, adverse drug reactions, diagnosis change, or new procedure performed?: [x] No    [] Yes (see summary sheet for update)  Medications: Verified on Patient Summary List    Subjective functional status/changes:     Pt states he is getting a little better. Pain use to be 9-10/10 and now it is 7-8/10. Pt states he is scheduled to return to work 23. OBJECTIVE    Therapeutic Procedures: Tx Min Billable or 1:1 Min (if diff from Tx Min) Procedure, Rationale, Specifics   30  97666 Therapeutic Exercise (timed):  increase ROM, strength, coordination, balance, and proprioception to improve patient's ability to progress to PLOF and address remaining functional goals. (see flow sheet as applicable)     Details if applicable:     10  58667 Manual Therapy (timed):  decrease pain, increase ROM, increase tissue extensibility, and decrease trigger points to improve patient's ability to progress to PLOF and address remaining functional goals.   The manual therapy interventions were performed at a separate and distinct time from the therapeutic

## 2023-11-03 ENCOUNTER — HOSPITAL ENCOUNTER (OUTPATIENT)
Facility: HOSPITAL | Age: 38
Setting detail: RECURRING SERIES
Discharge: HOME | End: 2023-11-06
Payer: COMMERCIAL

## 2023-11-03 PROCEDURE — G0283 ELEC STIM OTHER THAN WOUND: HCPCS

## 2023-11-03 PROCEDURE — 97110 THERAPEUTIC EXERCISES: CPT

## 2023-11-03 PROCEDURE — 97140 MANUAL THERAPY 1/> REGIONS: CPT

## 2023-11-03 NOTE — PROGRESS NOTES
PHYSICAL THERAPY - MEDICARE DAILY TREATMENT NOTE (updated 3/23)      Date: 11/3/2023          Patient Name:  Mayela Chou :  1985   Medical   Diagnosis:  Other low back pain [M54.59] Treatment Diagnosis:  M54.59  OTHER LOWER BACK PAIN    Referral Source:  Rhonda Night* Insurance:   Payor: [employer] Cover / Plan: Lucy Starch / Product Type: *No Product type* /                     Patient  verified yes     Visit #   Current  / Total 7 16   Time   In / Out 9:05am 10:00am   Total Treatment Time 55   Total Timed Codes 40   1:1 Treatment Time 40      [facility] Totals Reminder:  bill using total billable   min of TIMED therapeutic procedures and modalities. 8-22 min = 1 unit; 23-37 min = 2 units; 38-52 min = 3 units; 53-67 min = 4 units; 68-82 min = 5 units            SUBJECTIVE    Pain Level (0-10 scale): 8/10    Any medication changes, allergies to medications, adverse drug reactions, diagnosis change, or new procedure performed?: [x] No    [] Yes (see summary sheet for update)  Medications: Verified on Patient Summary List    Subjective functional status/changes:     Pt states around 6 o'clock this morning he woke up with severe pain going down the R leg. Pt states prior to going to bed he was sitting watching the football game. He did not feel any of the leg pain yesterday. He tried doing some stretching but that did not relieve the pain. OBJECTIVE    Therapeutic Procedures: Tx Min Billable or 1:1 Min (if diff from Tx Min) Procedure, Rationale, Specifics   25  47657 Therapeutic Exercise (timed):  increase ROM, strength, coordination, balance, and proprioception to improve patient's ability to progress to PLOF and address remaining functional goals.  (see flow sheet as applicable)     Details if applicable:     15  65905 Manual Therapy (timed):  decrease pain, increase ROM, increase tissue extensibility, and decrease trigger points to improve patient's ability to progress to PLOF and address

## 2023-11-07 ENCOUNTER — HOSPITAL ENCOUNTER (OUTPATIENT)
Facility: HOSPITAL | Age: 38
Setting detail: RECURRING SERIES
Discharge: HOME | End: 2023-11-10
Payer: COMMERCIAL

## 2023-11-07 PROCEDURE — 97110 THERAPEUTIC EXERCISES: CPT

## 2023-11-07 PROCEDURE — G0283 ELEC STIM OTHER THAN WOUND: HCPCS

## 2023-11-07 NOTE — PROGRESS NOTES
[address] [place] [address], Critical access hospital0 [facility], [address] [facility]  Ph: [phone]     Fax: [phone]    PHYSICAL THERAPY PROGRESS NOTE  Patient Name:  Maria Fernanda Layne :  1985   Treatment/Medical Diagnosis: Other low back pain [M54.59]   Referral Source:  Adelaida Rightandre*     Date of Initial Visit:  10/04/23 Attended Visits:  8 Missed Visits:  0     SUMMARY OF TREATMENT/ASSESSMENT:  Pt has completed 8 visits showing little to no improvement in radicular LBP at this time. He is still reporting high levels of pain radiating into the R LE. Centralization of pain noted with lumbar extension. Will continue with extension bias exercises at this time. Pt will benefit from skilled services to improve performance with core stabilization and posture, spine flexibility and strength, increased awareness of neuromuscular control of abdominal muscle activation and pelvic positioning, and to address impairments in order to meet functional goals. CURRENT STATUS  Physical Findings      Posture: In standing - decrease lumbar lordosis   Gait and Functional Mobility:  indep ambulation with decrease gait speed, increase pain/effort with transfers and bed mobility- same as eval, has to keep readjusting when sitting down. Palpation: TTP L3-5 spinous process, lumbar paraspinals, R PSIS. Tightness in paraspinals, TTP to R QL and R piriformis  Gross findings:  significant hamstring tightness, paraspinals, R QL, R piriformis, continued radicular symptoms into RLE at times all the way to toes.     Swelling: none noted      TRUNK ROM:   Flexion:                                  [] N/A  []WNL  []Minimal  []Moderate  [x] Major pulling pain in back, and increased radicular symptoms into RLE posterior thigh and groin    Extension:                             [] N/A  [x]WNL  []Minimal  []Moderate  [] Major  decrease pain, less radicular symptoms  Right Lateral Flexion:           []
progress to PLOF and address remaining functional goals. The manual therapy interventions were performed at a separate and distinct time from the therapeutic activities interventions . (see flow sheet as applicable)    Details if applicable:  R QL, superior glut and piriformis releases in prone                   45     Total Total         Modalities Rationale:     decrease inflammation and decrease pain to improve patient's ability to progress to PLOF and address remaining functional goals. 15   min [x] Estim Unattended,             type/location:       [x]  w/ice    []  w/heat Prone with pillow under chest and under shins   Skin assessment post-treatment (if applicable):    [x]  intact    []  redness- no adverse reaction                 []redness - adverse reaction:          [x]  Patient Education billed concurrently with other procedures   [x] Review HEP    [] Progressed/Changed HEP, detail:   [] Other detail:        Other Objective/Functional Measures  Physical Findings      Posture: In standing - decrease lumbar lordosis   Gait and Functional Mobility:  indep ambulation with decrease gait speed, increase pain/effort with transfers and bed mobility- same as eval, has to keep readjusting when sitting down. Palpation: TTP L3-5 spinous process, lumbar paraspinals, R PSIS. Tightness in paraspinals, TTP to R QL and R piriformis  Gross findings:  significant hamstring tightness, paraspinals, R QL, R piriformis, continued radicular symptoms into RLE.     Swelling: none noted      TRUNK ROM:   Flexion:                                  [] N/A  []WNL  []Minimal  []Moderate  [x] Major pulling pain in back, and increased radicular symptoms into RLE posterior and groin    Extension:                             [] N/A  [x]WNL  []Minimal  []Moderate  [] Major  decrease pain, less radicular symptoms  Right Lateral Flexion:           [] N/A  [x]WNL  []Minimal  []Moderate  [] Major no pulling  Left Lateral Flexion:

## 2023-11-09 ENCOUNTER — HOSPITAL ENCOUNTER (OUTPATIENT)
Facility: HOSPITAL | Age: 38
Discharge: HOME | End: 2023-11-09
Attending: FAMILY MEDICINE
Payer: COMMERCIAL

## 2023-11-09 DIAGNOSIS — D49.512 NEOPLASM OF UNSPECIFIED BEHAVIOR OF LEFT KIDNEY: ICD-10-CM

## 2023-11-09 PROCEDURE — 76770 US EXAM ABDO BACK WALL COMP: CPT

## 2023-11-10 ENCOUNTER — HOSPITAL ENCOUNTER (OUTPATIENT)
Facility: HOSPITAL | Age: 38
Setting detail: RECURRING SERIES
Discharge: HOME | End: 2023-11-13
Payer: COMMERCIAL

## 2023-11-10 PROCEDURE — 97110 THERAPEUTIC EXERCISES: CPT

## 2023-11-10 PROCEDURE — G0283 ELEC STIM OTHER THAN WOUND: HCPCS

## 2023-11-10 PROCEDURE — 97140 MANUAL THERAPY 1/> REGIONS: CPT

## 2023-11-10 NOTE — PROGRESS NOTES
PHYSICAL THERAPY - MEDICARE DAILY TREATMENT NOTE (updated 3/23)      Date: 11/10/2023          Patient Name:  Ulysses Beavers :  1985   Medical   Diagnosis:  Other low back pain [M54.59] Treatment Diagnosis:  M54.59  OTHER LOWER BACK PAIN    Referral Source:  Eva Brasher* Insurance:   Payor: Kavon Layne / Plan: Cristina Abbott / Product Type: *No Product type* /                     Patient  verified yes     Visit #   Current  / Total 9 16   Time   In / Out 9:05am 10:05am   Total Treatment Time 60   Total Timed Codes 45   1:1 Treatment Time 39      [facility] Totals Reminder:  bill using total billable   min of TIMED therapeutic procedures and modalities. 8-22 min = 1 unit; 23-37 min = 2 units; 38-52 min = 3 units; 53-67 min = 4 units; 68-82 min = 5 units            SUBJECTIVE    Pain Level (0-10 scale): 7/10    Any medication changes, allergies to medications, adverse drug reactions, diagnosis change, or new procedure performed?: [x] No    [] Yes (see summary sheet for update)  Medications: Verified on Patient Summary List    Subjective functional status/changes:     Pt states for the past couple of days he has mainly felt just the LBP. He has not had much leg pain. He has been doing the exercises at home daily. Pt states they found a mass on his kidney's that he had an US done and is waiting to see his doctor to see what the next steps will be. OBJECTIVE    Therapeutic Procedures: Tx Min Billable or 1:1 Min (if diff from Tx Min) Procedure, Rationale, Specifics   35  [id number] Therapeutic Exercise (timed):  increase ROM, strength, coordination, balance, and proprioception to improve patient's ability to progress to PLOF and address remaining functional goals.  (see flow sheet as applicable)     Details if applicable:     10  04358 Manual Therapy (timed):  decrease pain, increase ROM, increase tissue extensibility, and decrease trigger points to improve patient's ability to progress to PLOF and

## 2023-11-13 ENCOUNTER — HOSPITAL ENCOUNTER (OUTPATIENT)
Facility: HOSPITAL | Age: 38
Setting detail: RECURRING SERIES
Discharge: HOME | End: 2023-11-16
Payer: COMMERCIAL

## 2023-11-13 PROCEDURE — 97140 MANUAL THERAPY 1/> REGIONS: CPT

## 2023-11-13 PROCEDURE — G0283 ELEC STIM OTHER THAN WOUND: HCPCS

## 2023-11-13 PROCEDURE — 97110 THERAPEUTIC EXERCISES: CPT

## 2023-11-13 NOTE — PROGRESS NOTES
PHYSICAL THERAPY - MEDICARE DAILY TREATMENT NOTE (updated 3/23)      Date: 2023          Patient Name:  Eloise Adan :  1985   Medical   Diagnosis:  Other low back pain [M54.59] Treatment Diagnosis:  M54.59  OTHER LOWER BACK PAIN    Referral Source:  Yumiko Willis* Insurance:   Payor: José Miguel Strange / Plan: Rafaela Fakavelino / Product Type: *No Product type* /                     Patient  verified yes     Visit #   Current  / Total 10 16   Time   In / Out 10:05am 11:10am   Total Treatment Time 65   Total Timed Codes 50   1:1 Treatment Time 48      [facility] Totals Reminder:  bill using total billable   min of TIMED therapeutic procedures and modalities. 8-22 min = 1 unit; 23-37 min = 2 units; 38-52 min = 3 units; 53-67 min = 4 units; 68-82 min = 5 units            SUBJECTIVE    Pain Level (0-10 scale): 7/10    Any medication changes, allergies to medications, adverse drug reactions, diagnosis change, or new procedure performed?: [x] No    [] Yes (see summary sheet for update)  Medications: Verified on Patient Summary List    Subjective functional status/changes:     Pt states he just has back pain today, has not had much leg pain lately. Pt states he feels like the therapy is helping now. OBJECTIVE    Therapeutic Procedures: Tx Min Billable or 1:1 Min (if diff from Tx Min) Procedure, Rationale, Specifics   40  42411 Therapeutic Exercise (timed):  increase ROM, strength, coordination, balance, and proprioception to improve patient's ability to progress to PLOF and address remaining functional goals. (see flow sheet as applicable)     Details if applicable:     10  61948 Manual Therapy (timed):  decrease pain, increase ROM, increase tissue extensibility, and decrease trigger points to improve patient's ability to progress to PLOF and address remaining functional goals.   The manual therapy interventions were performed at a separate and distinct time from the therapeutic activities interventions

## 2023-11-15 ENCOUNTER — HOSPITAL ENCOUNTER (OUTPATIENT)
Facility: HOSPITAL | Age: 38
Setting detail: RECURRING SERIES
Discharge: HOME | End: 2023-11-18
Payer: COMMERCIAL

## 2023-11-15 PROCEDURE — G0283 ELEC STIM OTHER THAN WOUND: HCPCS

## 2023-11-15 PROCEDURE — 97140 MANUAL THERAPY 1/> REGIONS: CPT

## 2023-11-15 PROCEDURE — 97110 THERAPEUTIC EXERCISES: CPT

## 2023-11-15 NOTE — PROGRESS NOTES
PHYSICAL THERAPY - MEDICARE DAILY TREATMENT NOTE (updated 3/23)      Date: 11/15/2023          Patient Name:  Eva Romberg :  1985   Medical   Diagnosis:  Other low back pain [M54.59] Treatment Diagnosis:  M54.59  OTHER LOWER BACK PAIN    Referral Source:  Xander Carpenter* Insurance:   Payor: [employer] / Plan: [employer] / Product Type: *No Product type* /                     Patient  verified yes     Visit #   Current  / Total 11 16   Time   In / Out 10:06am 11:11am   Total Treatment Time 65   Total Timed Codes 50   1:1 Treatment Time 48      [facility] Totals Reminder:  bill using total billable   min of TIMED therapeutic procedures and modalities. 8-22 min = 1 unit; 23-37 min = 2 units; 38-52 min = 3 units; 53-67 min = 4 units; 68-82 min = 5 units            SUBJECTIVE    Pain Level (0-10 scale): 7/10    Any medication changes, allergies to medications, adverse drug reactions, diagnosis change, or new procedure performed?: [x] No    [] Yes (see summary sheet for update)  Medications: Verified on Patient Summary List    Subjective functional status/changes:     Pt reporting continued improvement and less radicular symptoms. States he has been using heating pad at night before bed and ice in the mornings and feels that is helping. States he is returning to work on  and has some accommodation paperwork he needs filled out. OBJECTIVE    Therapeutic Procedures: Tx Min Billable or 1:1 Min (if diff from Tx Min) Procedure, Rationale, Specifics   42  52253 Therapeutic Exercise (timed):  increase ROM, strength, coordination, balance, and proprioception to improve patient's ability to progress to PLOF and address remaining functional goals.  (see flow sheet as applicable)     Details if applicable:     8  42694 Manual Therapy (timed):  decrease pain, increase ROM, increase tissue extensibility, and decrease trigger points to improve patient's ability to progress to PLOF and address remaining

## 2023-11-21 ENCOUNTER — APPOINTMENT (OUTPATIENT)
Facility: HOSPITAL | Age: 38
End: 2023-11-21
Payer: COMMERCIAL

## 2023-11-22 ENCOUNTER — HOSPITAL ENCOUNTER (OUTPATIENT)
Facility: HOSPITAL | Age: 38
Setting detail: RECURRING SERIES
Discharge: HOME | End: 2023-11-25
Payer: COMMERCIAL

## 2023-11-22 PROCEDURE — G0283 ELEC STIM OTHER THAN WOUND: HCPCS

## 2023-11-22 PROCEDURE — 97140 MANUAL THERAPY 1/> REGIONS: CPT

## 2023-11-22 PROCEDURE — 97110 THERAPEUTIC EXERCISES: CPT

## 2023-11-22 NOTE — PROGRESS NOTES
PHYSICAL THERAPY - MEDICARE DAILY TREATMENT NOTE (updated 3/23)      Date: 2023          Patient Name:  Audi Alonso :  1985   Medical   Diagnosis:  Other low back pain [M54.59] Treatment Diagnosis:  M54.59  OTHER LOWER BACK PAIN    Referral Source:  Flako Cecilio* Insurance:   Payor: Douglas Prieto / Plan: [place] / Product Type: *No Product type* /                     Patient  verified yes     Visit #   Current  / Total 12 16   Time   In / Out 0350 pm 4:50 pm    Total Treatment Time 55   Total Timed Codes 40   1:1 Treatment Time 40      [facility] Totals Reminder:  bill using total billable   min of TIMED therapeutic procedures and modalities. 8-22 min = 1 unit; 23-37 min = 2 units; 38-52 min = 3 units; 53-67 min = 4 units; 68-82 min = 5 units            SUBJECTIVE    Pain Level (0-10 scale): 6/10    Any medication changes, allergies to medications, adverse drug reactions, diagnosis change, or new procedure performed?: [x] No    [] Yes (see summary sheet for update)  Medications: Verified on Patient Summary List    Subjective functional status/changes:     Patient reports more localized pain in his lower back, he has been using the ice and heat and thinks he has more pain in general from running from one job to the next. He started work Monday and they are letting him do his accomodation, so he stretches and uses ice at work which helps him get through the day. He wants to see how he feels after the holiday which he     OBJECTIVE    Therapeutic Procedures: Tx Min Billable or 1:1 Min (if diff from Tx Min) Procedure, Rationale, Specifics   30  99351 Therapeutic Exercise (timed):  increase ROM, strength, coordination, balance, and proprioception to improve patient's ability to progress to PLOF and address remaining functional goals.  (see flow sheet as applicable)     Details if applicable:     10  12856 Manual Therapy (timed):  decrease pain, increase ROM, increase tissue extensibility,

## 2023-11-27 ENCOUNTER — HOSPITAL ENCOUNTER (OUTPATIENT)
Facility: HOSPITAL | Age: 38
Setting detail: RECURRING SERIES
Discharge: HOME | End: 2023-11-30
Payer: COMMERCIAL

## 2023-11-27 PROCEDURE — 97110 THERAPEUTIC EXERCISES: CPT | Performed by: PHYSICAL THERAPIST

## 2023-11-27 PROCEDURE — G0283 ELEC STIM OTHER THAN WOUND: HCPCS | Performed by: PHYSICAL THERAPIST

## 2023-11-27 PROCEDURE — 97140 MANUAL THERAPY 1/> REGIONS: CPT | Performed by: PHYSICAL THERAPIST

## 2023-11-27 NOTE — PROGRESS NOTES
therapy interventions were performed at a separate and distinct time from the therapeutic activities interventions . (see flow sheet as applicable)    Details if applicable:  QL release, STM lumbar paraspinals, PA glides and  PA mobs with prone trunk extension                  40     Total Total         Modalities Rationale:     decrease inflammation and decrease pain to improve patient's ability to progress to PLOF and address remaining functional goals. 15   min [x] Estim Unattended,             type/location: IFC/R side back        [x]  w/ice    []  w/heat prone   Skin assessment post-treatment (if applicable):    [x]  intact    []  redness- no adverse reaction                 []redness - adverse reaction:          [x]  Patient Education billed concurrently with other procedures continue HEP  [x] Review HEP    [] Progressed/Changed HEP, detail:   [] Other detail:  Showed pt information on lumbar roll support for office chairs as he will need to sit throughout the day once he returns to work. Other Objective/Functional Measures  Continued exercises per flowsheet and soft tissue release. Progressed T/L stabilization exercises with supine march and SLR as per flow sheet. Pain Level post session (0-10 scale): 0/10  \"Ice makes it numb\"     Assessment    Patient continues to present with moderate pain levels, though he reports overall decreased intensity in pain since [facility] and he is no longer having radicular symptoms. Tolerated progression of T/L stabilization exercises well with some LE fatigue reported/noted but no increased pain. Pt reports compliance with his HEP . Discussed continuing 3 visits to complete POC and progress stabilization exercises. He has follow up with ortho this week to discuss MRI findings. Patient will continue to benefit from skilled PT / OT services to modify and progress therapeutic interventions, analyze and address functional mobility deficits, analyze and address ROM

## 2023-12-01 ENCOUNTER — HOSPITAL ENCOUNTER (OUTPATIENT)
Facility: HOSPITAL | Age: 38
Setting detail: RECURRING SERIES
Discharge: HOME | End: 2023-12-04
Payer: COMMERCIAL

## 2023-12-01 PROCEDURE — 97110 THERAPEUTIC EXERCISES: CPT | Performed by: PHYSICAL THERAPIST

## 2023-12-01 PROCEDURE — G0283 ELEC STIM OTHER THAN WOUND: HCPCS | Performed by: PHYSICAL THERAPIST

## 2023-12-01 NOTE — PROGRESS NOTES
PHYSICAL THERAPY - MEDICARE DAILY TREATMENT NOTE (updated 3/23)      Date: 2023          Patient Name:  Dewey Kaur :  1985   Medical   Diagnosis:  Other low back pain [M54.59] Treatment Diagnosis:  M54.59  OTHER LOWER BACK PAIN    Referral Source:  Yrn Son* Insurance:   Payor: Tawana Single / Plan: [place] / Product Type: *No Product type* /                     Patient  verified yes     Visit #   Current  / Total 14 16   Time   In / Out 8:15 am 9:05 am    Total Treatment Time 45   Total Timed Codes 30   1:1 Treatment Time 30      [facility] Totals Reminder:  bill using total billable   min of TIMED therapeutic procedures and modalities. 8-22 min = 1 unit; 23-37 min = 2 units; 38-52 min = 3 units; 53-67 min = 4 units; 68-82 min = 5 units            SUBJECTIVE    Pain Level (0-10 scale): 5-6/10 central LB    Any medication changes, allergies to medications, adverse drug reactions, diagnosis change, or new procedure performed?: [x] No    [] Yes (see summary sheet for update)  Medications: Verified on Patient Summary List    Subjective functional status/changes:     Patient reports he is ~60% better since beginning PT, reporting overall decrease in pain intensity and radicular symptoms have resolved. He reports he saw the orthopedic doctor Wednesday who released him and said he could stop PT. Patient reports he is ready for discharge and feels he can manage the pain on his own with HEP. Arrived 15 minutes late. OBJECTIVE    Therapeutic Procedures: Tx Min Billable or 1:1 Min (if diff from Tx Min) Procedure, Rationale, Specifics   30  [id number] Therapeutic Exercise (timed):  increase ROM, strength, coordination, balance, and proprioception to improve patient's ability to progress to PLOF and address remaining functional goals.  (see flow sheet as applicable)     Details if applicable:     30     Total Total         Modalities Rationale:     decrease inflammation and decrease pain

## 2023-12-01 NOTE — THERAPY DISCHARGE
use proper sleeping techniques for pain prevention and ideal posture to prevent muscle shortening contributing to pain issues. [] Met [x] Not met [] Partially met  Date:11/27 reports no disruptions in sleep due to pain in past few days, but not using pillows  11/7      Long Term Goals: To be accomplished in 16 treatments. Pt will have improved AMPAC score by 5%. [x] Met [] Not met [] Partially met Date: 11/7 12/1     Pt will be able to sit greater than 20-30 minutes without pain for greater ease at work. [] Met [] Not met [x] Partially met Date: 12/1 continues with pain at baseline, but able to tolerate sitting better 11/13 with proper back support      Pt will be able to stand greater than 20-30 minutes without pain > 3/10 so they can do ADL's like wash dishes. [] Met [] Not met [x] Partially met Date: 12/1 reports improvement but pain still >3 11/7     Pt will be able to ambulate community distances without increase of pain or difficulty to get groceries, medicine, etc.      [] Met [] Not met [x] Partially met Date: 11/7 still painful   12/1 reports improvement but still has pain at baseline     Pt will be able to go up and down steps without difficulty or pain > 3/10 for greater ease getting in/out of home. [] Met [] Not met [x] Partially met Date: 11/7 no increase in pain but baseline pain is >3/10   12/1 reports improvement but pain still >3      Pt will be able to retrieve items from the ground without pain, for safe return to work. [] Met [] Not met [x] Partially met Date: 11/7 12/1 able to bend forward without increased pain, but still has pain at baseline     Pt will be able to lift/carry >/= 10-20 lbs without pain, for safe return to work.                 [] Met [] Not met [x] Partially met Date: 12/1 still has pain at baseline  11/27 now able to lift 3year old without increased pain but not able to 11/7 cant  3year old son     Pt will report centralization

## 2024-02-03 ENCOUNTER — HOSPITAL ENCOUNTER (EMERGENCY)
Facility: HOSPITAL | Age: 39
Discharge: HOME | End: 2024-02-03
Attending: EMERGENCY MEDICINE
Payer: COMMERCIAL

## 2024-02-03 VITALS
RESPIRATION RATE: 17 BRPM | HEART RATE: 73 BPM | BODY MASS INDEX: 29.99 KG/M2 | SYSTOLIC BLOOD PRESSURE: 146 MMHG | DIASTOLIC BLOOD PRESSURE: 81 MMHG | HEIGHT: 65 IN | WEIGHT: 180 LBS | OXYGEN SATURATION: 100 % | TEMPERATURE: 99 F

## 2024-02-03 DIAGNOSIS — R30.0 DYSURIA: Primary | ICD-10-CM

## 2024-02-03 DIAGNOSIS — Z11.3 SCREENING EXAMINATION FOR STD (SEXUALLY TRANSMITTED DISEASE): ICD-10-CM

## 2024-02-03 LAB
APPEARANCE UR: CLEAR
BACTERIA URNS QL MICRO: NEGATIVE /HPF
BILIRUB UR QL: NEGATIVE
COLOR UR: YELLOW
EPITH CASTS URNS QL MICRO: ABNORMAL /LPF
GLUCOSE UR STRIP.AUTO-MCNC: NEGATIVE MG/DL
HGB UR QL STRIP: NEGATIVE
KETONES UR QL STRIP.AUTO: NEGATIVE MG/DL
LEUKOCYTE ESTERASE UR QL STRIP.AUTO: NEGATIVE
NITRITE UR QL STRIP.AUTO: NEGATIVE
PH UR STRIP: 5 (ref 5–8)
PROT UR STRIP-MCNC: NEGATIVE MG/DL
RBC #/AREA URNS HPF: ABNORMAL /HPF (ref 0–5)
SP GR UR REFRACTOMETRY: 1.02 (ref 1–1.03)
URINE CULTURE IF INDICATED: ABNORMAL
UROBILINOGEN UR QL STRIP.AUTO: 0.1 EU/DL (ref 0.2–1)
WBC URNS QL MICRO: ABNORMAL /HPF (ref 0–4)

## 2024-02-03 PROCEDURE — 87591 N.GONORRHOEAE DNA AMP PROB: CPT

## 2024-02-03 PROCEDURE — 81001 URINALYSIS AUTO W/SCOPE: CPT

## 2024-02-03 PROCEDURE — 87491 CHLMYD TRACH DNA AMP PROBE: CPT

## 2024-02-03 PROCEDURE — 2500000003 HC RX 250 WO HCPCS: Performed by: EMERGENCY MEDICINE

## 2024-02-03 PROCEDURE — 96372 THER/PROPH/DIAG INJ SC/IM: CPT

## 2024-02-03 PROCEDURE — 6370000000 HC RX 637 (ALT 250 FOR IP): Performed by: EMERGENCY MEDICINE

## 2024-02-03 PROCEDURE — 99284 EMERGENCY DEPT VISIT MOD MDM: CPT

## 2024-02-03 PROCEDURE — 6360000002 HC RX W HCPCS: Performed by: EMERGENCY MEDICINE

## 2024-02-03 RX ORDER — DOXYCYCLINE HYCLATE 100 MG/1
100 CAPSULE ORAL ONCE
Status: COMPLETED | OUTPATIENT
Start: 2024-02-03 | End: 2024-02-03

## 2024-02-03 RX ORDER — DOXYCYCLINE HYCLATE 100 MG/1
100 CAPSULE ORAL EVERY 12 HOURS SCHEDULED
Status: DISCONTINUED | OUTPATIENT
Start: 2024-02-03 | End: 2024-02-03 | Stop reason: HOSPADM

## 2024-02-03 RX ORDER — DOXYCYCLINE HYCLATE 100 MG
100 TABLET ORAL 2 TIMES DAILY
Qty: 14 TABLET | Refills: 0 | Status: SHIPPED | OUTPATIENT
Start: 2024-02-03 | End: 2024-02-10

## 2024-02-03 RX ADMIN — DOXYCYCLINE HYCLATE 100 MG: 100 CAPSULE ORAL at 11:20

## 2024-02-03 RX ADMIN — LIDOCAINE HYDROCHLORIDE 500 MG: 10 INJECTION, SOLUTION EPIDURAL; INFILTRATION; INTRACAUDAL; PERINEURAL at 11:20

## 2024-02-03 ASSESSMENT — PAIN - FUNCTIONAL ASSESSMENT: PAIN_FUNCTIONAL_ASSESSMENT: 0-10

## 2024-02-03 ASSESSMENT — PAIN SCALES - GENERAL: PAINLEVEL_OUTOF10: 3

## 2024-02-03 NOTE — ED PROVIDER NOTES
General: He is not in acute distress.     Appearance: Normal appearance.   Cardiovascular:      Rate and Rhythm: Normal rate.   Pulmonary:      Effort: Pulmonary effort is normal.   Abdominal:      Palpations: Abdomen is soft.      Tenderness: There is no abdominal tenderness.      Comments: No focal abd exam findings   Skin:     General: Skin is warm.      Capillary Refill: Capillary refill takes less than 2 seconds.      Findings: No bruising or erythema.   Neurological:      Mental Status: He is alert.       ***    SCREENINGS                  LAB, EKG AND DIAGNOSTIC RESULTS   Labs:  No results found for this or any previous visit (from the past 12 hour(s)).    EKG: Not Applicable    Radiologic Studies:  Non-plain film images such as CT, Ultrasound and MRI are read by the radiologist. Plain radiographic images are visualized and preliminarily interpreted by the ED Physician with the following findings: Not Applicable.    Interpretation per the Radiologist below, if available at the time of this note:  No orders to display        ED COURSE and DIFFERENTIAL DIAGNOSIS/MDM   10:01 AM DDx, ED Course, and Reassessment: ***    Records Reviewed (source and summary of external notes): Prior medical records and Nursing notes    Vitals:    Vitals:    02/03/24 0959   BP: (!) 146/81   Pulse: 73   Resp: 17   Temp: 99 °F (37.2 °C)   SpO2: 100%   Weight: 81.6 kg (180 lb)   Height: 1.651 m (5' 5\")        ED COURSE       Clinical Management Tools:  {CMT List:57929::\"Not Applicable\"}    Sepsis Reassessment: {Sepsis reassessment smartlist:23614}    Disposition Considerations (Tests not done, Shared Decision Making, Pt Expectation of Test or Treatment.): {Dispo considerations:18976}    Patient was given the following medications:  Medications - No data to display    CONSULTS: (Who and What was discussed)  None     Social Determinants affecting Dx or Tx: {Social Determinants:56955}    Smoking Cessation: {smoking cessation  BOTH.        [facility] EMERGENCY DEPARTMENT  [address] [facility] [place] [address] [phone]    If symptoms worsen        DISCHARGE MEDICATIONS:     Medication List      You have not been prescribed any medications.           DISCONTINUED MEDICATIONS:  Current Discharge Medication List        STOP taking these medications       ketorolac (TORADOL) 10 MG tablet Comments:   Reason for Stopping:         acetaminophen (TYLENOL) 500 MG tablet Comments:   Reason for Stopping:         lidocaine (LIDODERM) 5 % Comments:   Reason for Stopping:               I am the Primary Clinician of Record: Lina Nguyen MD (electronically signed)    (Please note that parts of this dictation were completed with voice recognition software. Quite often unanticipated grammatical, syntax, homophones, and other interpretive errors are inadvertently transcribed by the computer software. Please disregards these errors. Please excuse any errors that have escaped final proofreading.)     Lina Nguyen MD  02/05/24 5783

## 2024-02-03 NOTE — DISCHARGE INSTRUCTIONS
Thank you!  Thank you for allowing me to care for you in the emergency department. It is my goal to provide you with excellent care.  Please fill out the survey that will come to you by mail or email since we listen to your feedback!     Below you will find a list of your tests from today's visit.  Should you have any questions, please do not hesitate to call the emergency department.    Labs  Recent Results (from the past 12 hour(s))   Urinalysis with Reflex to Culture    Collection Time: 02/03/24 10:00 AM    Specimen: Urine   Result Value Ref Range    Color, UA Yellow      Appearance Clear Clear      Specific Gravity, UA 1.025 1.003 - 1.030      pH, Urine 5.0 5.0 - 8.0      Protein, UA Negative Negative mg/dL    Glucose, UA Negative Negative mg/dL    Ketones, Urine Negative Negative mg/dL    Bilirubin Urine Negative Negative      Blood, Urine Negative Negative      Urobilinogen, Urine 0.1 (L) 0.2 - 1.0 EU/dL    Nitrite, Urine Negative Negative      Leukocyte Esterase, Urine Negative Negative      WBC, UA 0-4 0 - 4 /hpf    RBC, UA 0-5 0 - 5 /hpf    Epithelial Cells UA Few Few /lpf    BACTERIA, URINE Negative Negative /hpf    Urine Culture if Indicated Culture not indicated by UA result Culture not indicated by UA result         Radiologic Studies  No orders to display     ------------------------------------------------------------------------------------------------------------  The exam and treatment you received in the Emergency Department were for an urgent problem and are not intended as complete care. It is important that you follow-up with a doctor, nurse practitioner, or physician assistant to:  (1) confirm your diagnosis,  (2) re-evaluation of changes in your illness and treatment, and (3) for ongoing care. Please take your discharge instructions with you when you go to your follow-up appointment.     If you have any problem arranging a follow-up appointment, contact the Emergency Department.  If your

## 2024-12-11 ENCOUNTER — HOSPITAL ENCOUNTER (OUTPATIENT)
Facility: HOSPITAL | Age: 39
Setting detail: RECURRING SERIES
Discharge: HOME | End: 2024-12-14
Payer: COMMERCIAL

## 2024-12-11 PROCEDURE — 97110 THERAPEUTIC EXERCISES: CPT | Performed by: PHYSICAL THERAPIST

## 2024-12-11 PROCEDURE — 97161 PT EVAL LOW COMPLEX 20 MIN: CPT | Performed by: PHYSICAL THERAPIST

## 2024-12-11 PROCEDURE — 97162 PT EVAL MOD COMPLEX 30 MIN: CPT | Performed by: PHYSICAL THERAPIST

## 2024-12-11 NOTE — THERAPY EVALUATION
of care has been reviewed with MADELIN MARIESONJA, PT       12/11/2024       1:47 PM        ===================================================================  I certify that the above Therapy Services are being furnished while the patient is under my care. I agree with the treatment plan and certify that this therapy is necessary.    Physician's Signature:_________________________   DATE:_________   TIME:________                           George Handy MD    ** Signature, Date and Time must be completed for valid certification **  Please sign and fax to [phone].  Thank you

## 2025-02-25 ENCOUNTER — HOSPITAL ENCOUNTER (OUTPATIENT)
Facility: HOSPITAL | Age: 40
Setting detail: RECURRING SERIES
Discharge: HOME | End: 2025-02-28
Payer: COMMERCIAL

## 2025-02-25 PROCEDURE — 97162 PT EVAL MOD COMPLEX 30 MIN: CPT

## 2025-02-25 PROCEDURE — 97110 THERAPEUTIC EXERCISES: CPT

## 2025-02-25 NOTE — THERAPY EVALUATION
treatments.     Pt will have improved AMPAC score by 10%.        [] Met [] Not met [] Partially met Date:      Pt will be able to sit greater than 30-45 minutes without pain >2/10 so he can attend Buddhism, watch a movie, etc.      [] Met [] Not met [] Partially met Date:      Pt will be able to ambulate 1200 feet in a 6 min walk test to show safe gait with community distances without increase of pain or difficulty to get groceries, medicine, etc.      [] Met [] Not met [] Partially met Date:      Pt will  have trunk flexion WFL to be able to bend down and put on socks/shoes or  items from the floor without difficulty or increased pain.      [] Met [] Not met [] Partially met Date:      Pt will report centralization of symptoms/less radicular pain by 50%.      [] Met [] Not met [] Partially met Date:                   Frequency / Duration: Patient to be seen 1-2 times per week for 8 treatments.     Patient/ Caregiver education and instruction: Diagnosis, prognosis, exercises and other POC, sleeping positions, walking more regularly  [x]  Plan of care has been reviewed with PTA     Certification period: 2/25/25 - 04/26/25        Jacquelyn Alvarenga, PT       2/25/2025       3:57 PM        ===================================================================  I certify that the above Therapy Services are being furnished while the patient is under my care. I agree with the treatment plan and certify that this therapy is necessary.    Physician's Signature:_________________________   DATE:_________   TIME:________                           George Handy MD    ** Signature, Date and Time must be completed for valid certification **  Please sign and fax to [phone].  Thank you

## 2025-02-27 ENCOUNTER — TRANSCRIBE ORDERS (OUTPATIENT)
Facility: HOSPITAL | Age: 40
End: 2025-02-27

## 2025-02-27 ENCOUNTER — HOSPITAL ENCOUNTER (OUTPATIENT)
Facility: HOSPITAL | Age: 40
Discharge: HOME | End: 2025-02-27
Payer: COMMERCIAL

## 2025-02-27 DIAGNOSIS — R05.9 COUGH, UNSPECIFIED TYPE: Primary | ICD-10-CM

## 2025-02-27 DIAGNOSIS — R05.9 COUGH, UNSPECIFIED TYPE: ICD-10-CM

## 2025-02-27 PROCEDURE — 71046 X-RAY EXAM CHEST 2 VIEWS: CPT

## 2025-03-13 ENCOUNTER — OFFICE VISIT (OUTPATIENT)
Age: 40
End: 2025-03-13
Payer: COMMERCIAL

## 2025-03-13 VITALS
HEIGHT: 65 IN | HEART RATE: 74 BPM | DIASTOLIC BLOOD PRESSURE: 86 MMHG | OXYGEN SATURATION: 100 % | SYSTOLIC BLOOD PRESSURE: 128 MMHG | WEIGHT: 188 LBS | BODY MASS INDEX: 31.32 KG/M2

## 2025-03-13 DIAGNOSIS — Z20.2 POSSIBLE EXPOSURE TO STI: ICD-10-CM

## 2025-03-13 DIAGNOSIS — Z20.2 POSSIBLE EXPOSURE TO STI: Primary | ICD-10-CM

## 2025-03-13 LAB
BILIRUBIN, URINE, POC: NEGATIVE
BLOOD URINE, POC: NEGATIVE
GLUCOSE URINE, POC: NEGATIVE
KETONES, URINE, POC: NEGATIVE
LEUKOCYTE ESTERASE, URINE, POC: NEGATIVE
NITRITE, URINE, POC: NEGATIVE
PH, URINE, POC: 6 (ref 4.6–8)
PROTEIN,URINE, POC: NEGATIVE
SPECIFIC GRAVITY, URINE, POC: 1.02 (ref 1–1.03)
URINALYSIS CLARITY, POC: CLEAR
URINALYSIS COLOR, POC: YELLOW
UROBILINOGEN, POC: NORMAL

## 2025-03-13 PROCEDURE — 81003 URINALYSIS AUTO W/O SCOPE: CPT | Performed by: STUDENT IN AN ORGANIZED HEALTH CARE EDUCATION/TRAINING PROGRAM

## 2025-03-13 PROCEDURE — 99204 OFFICE O/P NEW MOD 45 MIN: CPT | Performed by: STUDENT IN AN ORGANIZED HEALTH CARE EDUCATION/TRAINING PROGRAM

## 2025-03-13 RX ORDER — LIDOCAINE 50 MG/G
PATCH TOPICAL
COMMUNITY
Start: 2024-11-14

## 2025-03-13 NOTE — PROGRESS NOTES
HISTORY OF PRESENT ILLNESS  Lobo Rooney is a 39 y.o. male.  Chief Complaint   Patient presents with    New Patient    [facility]       39-year-old male with recent unprotected sex with girlfriend and concerned he may have been exposed to sexually transmitted infection.  No actively draining lesions on penis and no discharge from urethra.        PAST MEDICAL HISTORY:  PMHx (including negatives):  has no past medical history on file.   PSurgHx:  has a past surgical history that includes Carpal tunnel release (Right).  PSocHx:  reports that he has been smoking cigarettes. He has never used smokeless tobacco. He reports current alcohol use. He reports current drug use. Drug: Marijuana (Weed).     Review of Systems      Physical Exam  Constitutional:       General: He is not in acute distress.     Appearance: Normal appearance. He is not ill-appearing.   HENT:      Head: Normocephalic and atraumatic.      Nose: Nose normal.   Eyes:      Extraocular Movements: Extraocular movements intact.      Pupils: Pupils are equal, round, and reactive to light.   Pulmonary:      Effort: Pulmonary effort is normal. No respiratory distress.   Abdominal:      General: There is no distension.   Musculoskeletal:         General: No deformity.      Cervical back: Normal range of motion.   Skin:     Coloration: Skin is not jaundiced or pale.   Neurological:      General: No focal deficit present.      Mental Status: He is alert and oriented to person, place, and time.   Psychiatric:         Mood and Affect: Mood normal.         ASSESSMENT AND PLAN      1. Possible exposure to STI  Assessment & Plan:  New, unknown prognosis.  Orders:  -     HIV 1/2 Ag/Ab, 4TH Generation,W Rflx Confirm; Future  -     Hepatitis C Antibody; Future  -     RPR; Future  -     Chlamydia, Gonorrhea, Trichomoniasis  -     AMB POC URINALYSIS DIP STICK AUTO W/O MICRO         Chau Spain MD      Please note that portions of this note were completed with

## 2025-03-13 NOTE — PROGRESS NOTES
Chief Complaint   Patient presents with    New Patient    Establish Care     1. Have you been to the ER, urgent care clinic since your last visit?  Hospitalized since your last visit?No    2. Have you seen or consulted any other health care providers outside of the [facility] since your last visit?  Include any pap smears or colon screening. Yes When: 2/2025 Where: Dr. Raine Ibarra Reason for visit: Primary Care  /86   Pulse 74   Ht 1.651 m (5' 5\")   Wt 85.3 kg (188 lb)   SpO2 100%   BMI 31.28 kg/m²

## 2025-03-14 LAB
HCV IGG SERPL QL IA: NON REACTIVE
HIV 1+2 AB+HIV1 P24 AG SERPL QL IA: NON REACTIVE
RPR SER QL: NON REACTIVE

## 2025-03-15 LAB
C TRACH RRNA SPEC QL NAA+PROBE: NEGATIVE
N GONORRHOEA RRNA SPEC QL NAA+PROBE: NEGATIVE
T VAGINALIS RRNA SPEC QL NAA+PROBE: NEGATIVE

## 2025-03-20 ENCOUNTER — OFFICE VISIT (OUTPATIENT)
Age: 40
End: 2025-03-20

## 2025-03-20 VITALS
SYSTOLIC BLOOD PRESSURE: 134 MMHG | HEART RATE: 87 BPM | HEIGHT: 65 IN | RESPIRATION RATE: 16 BRPM | BODY MASS INDEX: 30.79 KG/M2 | DIASTOLIC BLOOD PRESSURE: 72 MMHG | OXYGEN SATURATION: 95 % | TEMPERATURE: 98.5 F | WEIGHT: 184.8 LBS

## 2025-03-20 DIAGNOSIS — R76.12 (QFT) QUANTIFERON-TB TEST REACTION WITHOUT ACTIVE TUBERCULOSIS: ICD-10-CM

## 2025-03-20 DIAGNOSIS — Z22.7 TB LUNG, LATENT: Primary | ICD-10-CM

## 2025-03-20 NOTE — PROGRESS NOTES
\"Have you been to the ER, urgent care clinic since your last visit?  Hospitalized since your last visit?\"    NO    “Have you seen or consulted any other health care providers outside our system since your last visit?”    YES - When: approximately 2 months ago.  Where and Why: Dr. Handy for back pain.      Chief Complaint   Patient presents with    New Patient    Positive PPD     /72 (BP Site: Right Upper Arm, Patient Position: Sitting, BP Cuff Size: Medium Adult)   Pulse 87   Temp 98.5 °F (36.9 °C) (Temporal)   Resp 16   Ht 1.651 m (5' 5\")   Wt 83.8 kg (184 lb 12.8 oz)   SpO2 95%   BMI 30.75 kg/m²

## 2025-03-21 ENCOUNTER — TELEPHONE (OUTPATIENT)
Age: 40
End: 2025-03-21

## 2025-03-21 ENCOUNTER — RESULTS FOLLOW-UP (OUTPATIENT)
Age: 40
End: 2025-03-21

## 2025-03-21 DIAGNOSIS — R76.12 POSITIVE QUANTIFERON-TB GOLD TEST: ICD-10-CM

## 2025-03-21 DIAGNOSIS — Z22.7 TB LUNG, LATENT: Primary | ICD-10-CM

## 2025-03-21 LAB
ALBUMIN SERPL-MCNC: 4.7 G/DL (ref 4.1–5.1)
ALP SERPL-CCNC: 107 IU/L (ref 44–121)
ALT SERPL-CCNC: 31 IU/L (ref 0–44)
AST SERPL-CCNC: 23 IU/L (ref 0–40)
BILIRUB DIRECT SERPL-MCNC: 0.12 MG/DL (ref 0–0.4)
BILIRUB SERPL-MCNC: 0.5 MG/DL (ref 0–1.2)
PROT SERPL-MCNC: 6.9 G/DL (ref 6–8.5)

## 2025-03-21 RX ORDER — RIFAMPIN 300 MG/1
600 CAPSULE ORAL DAILY
Qty: 240 CAPSULE | Refills: 0 | Status: SHIPPED | OUTPATIENT
Start: 2025-03-21 | End: 2025-07-19

## 2025-03-21 NOTE — PROGRESS NOTES
Patient with latent TB identified with positive Quantiferon, asymptomatic and normal CXR.  LFTs  done before starting chemoprophylaxis are normal.  Will statt Rifampin 600 mg daily for 4 months. Repeat LFTs in 4-6 weeks.

## 2025-03-21 NOTE — TELEPHONE ENCOUNTER
Patient with latent TB identified with positive Quantiferon, asymptomatic and normal CXR.  LFTs  done before starting chemoprophylaxis are normal.  Will statt Rifampin 600 mg daily for 4 months. Repeat LFTs in 4-6 weeks. Rx has been sent.

## 2025-05-12 ENCOUNTER — OFFICE VISIT (OUTPATIENT)
Age: 40
End: 2025-05-12
Payer: COMMERCIAL

## 2025-05-12 VITALS
BODY MASS INDEX: 30.66 KG/M2 | WEIGHT: 184 LBS | SYSTOLIC BLOOD PRESSURE: 106 MMHG | HEART RATE: 67 BPM | DIASTOLIC BLOOD PRESSURE: 61 MMHG | HEIGHT: 65 IN

## 2025-05-12 DIAGNOSIS — Z20.2 POSSIBLE EXPOSURE TO STI: Primary | ICD-10-CM

## 2025-05-12 PROCEDURE — 99213 OFFICE O/P EST LOW 20 MIN: CPT | Performed by: STUDENT IN AN ORGANIZED HEALTH CARE EDUCATION/TRAINING PROGRAM

## 2025-05-12 NOTE — PROGRESS NOTES
HISTORY OF PRESENT ILLNESS    History of Present Illness  The patient is a 39-year-old male who presents to review lab work.    He reports no new symptoms, drainage, or discharge. There have been no significant changes in his health status since the last consultation. He has been diagnosed with latent tuberculosis (TB) and is currently under the care of Dr. Pelayo. He was prescribed medication that could potentially affect his liver function, necessitating regular blood work. However, he reports no adverse effects from the medication.    He has observed some skin discoloration on his penis and legs, which is not associated with any pain or elevation.    PAST MEDICAL HISTORY:  PMHx (including negatives):  has a past medical history of Tuberculosis.   PSurgHx:  has a past surgical history that includes Carpal tunnel release (Right).  PSocHx:  reports that he has been smoking cigarettes. He started smoking about 25 years ago. He has a 12.5 pack-year smoking history. He has never used smokeless tobacco. He reports current alcohol use of about 16.0 standard drinks of alcohol per week. He reports current drug use. Frequency: 5.00 times per week. Drugs: Marijuana (Weed) and Cocaine.     PHYSICAL EXAM:  General: Well developed, no acute distress  CV: Well perfused, regular rate  Lungs: Normal work of breathing  Abdomen: Soft, Non-distended    ASSESSMENT AND PLAN    Assessment & Plan  1. Latent tuberculosis.  He is currently on medication for latent TB, which may affect liver function. He has been advised to continue monitoring his liver function through regular blood work and follow up with Dr. Seth.     2. Possible Vitiligo.  He has been reassured that this condition is not malignant, does not pose any health risks, and is not contagious. He has been advised to monitor the condition for any changes.    Chau Spain MD    Please note that portions of this note were completed with Dragon dictation and [employer]

## 2025-05-12 NOTE — PROGRESS NOTES
Chief Complaint   Patient presents with    Follow-up     1. Have you been to the ER, urgent care clinic since your last visit?  Hospitalized since your last visit?No    2. Have you seen or consulted any other health care providers outside of the [facility] since your last visit?  Include any pap smears or colon screening. No  /61   Pulse 67   Ht 1.651 m (5' 5\")   Wt 83.5 kg (184 lb)   BMI 30.62 kg/m²

## 2025-05-13 LAB
ALBUMIN SERPL-MCNC: 4.5 G/DL (ref 4.1–5.1)
ALP SERPL-CCNC: 104 IU/L (ref 44–121)
ALT SERPL-CCNC: 27 IU/L (ref 0–44)
AST SERPL-CCNC: 25 IU/L (ref 0–40)
BILIRUB DIRECT SERPL-MCNC: 0.35 MG/DL (ref 0–0.4)
BILIRUB SERPL-MCNC: 0.9 MG/DL (ref 0–1.2)
PROT SERPL-MCNC: 6.9 G/DL (ref 6–8.5)